# Patient Record
Sex: MALE | Race: WHITE | URBAN - METROPOLITAN AREA
[De-identification: names, ages, dates, MRNs, and addresses within clinical notes are randomized per-mention and may not be internally consistent; named-entity substitution may affect disease eponyms.]

---

## 2017-08-03 ENCOUNTER — APPOINTMENT (RX ONLY)
Dept: URBAN - METROPOLITAN AREA CLINIC 130 | Facility: CLINIC | Age: 37
Setting detail: DERMATOLOGY
End: 2017-08-03

## 2017-08-03 DIAGNOSIS — L30.1 DYSHIDROSIS [POMPHOLYX]: ICD-10-CM

## 2017-08-03 PROCEDURE — ? TREATMENT REGIMEN

## 2017-08-03 PROCEDURE — ? COUNSELING

## 2017-08-03 PROCEDURE — 99202 OFFICE O/P NEW SF 15 MIN: CPT

## 2017-08-03 PROCEDURE — ? PRESCRIPTION

## 2017-08-03 RX ORDER — HALOBETASOL PROPIONATE 0.5 MG/G
CREAM TOPICAL
Qty: 2 | Refills: 1 | Status: ERX | COMMUNITY
Start: 2017-08-03

## 2017-08-03 RX ORDER — HYDROXYZINE HYDROCHLORIDE 10 MG/1
TABLET, FILM COATED ORAL
Qty: 100 | Refills: 1 | Status: ERX | COMMUNITY
Start: 2017-08-03

## 2017-08-03 RX ADMIN — HYDROXYZINE HYDROCHLORIDE: 10 TABLET, FILM COATED ORAL at 20:57

## 2017-08-03 RX ADMIN — HALOBETASOL PROPIONATE: 0.5 CREAM TOPICAL at 20:56

## 2017-08-03 NOTE — PROCEDURE: TREATMENT REGIMEN
Detail Level: Zone
Initiate Treatment: halobetasol cream bid, use 6-8+ times a day at first to get under control\\nHDZ 10 mg 1-2 q 4-6 hours prn itching

## 2017-08-03 NOTE — HPI: RASH
How Severe Is Your Rash?: mild
Is This A New Presentation, Or A Follow-Up?: Rash
How Severe Is Your Rash?: moderate
Additional History: went to urgent care 4 weeks ago when it first started, was prescribed Silvadene cream, TMC cream .05% , Prednisone 20mg bid x 5 days, ketoconazole cream 2%, Clindamycin 300mg tid x 10 days. Things improved for a few days but flared again, today is a good day for the hands.\\nNothing new or different he can think of

## 2017-09-08 ENCOUNTER — APPOINTMENT (RX ONLY)
Dept: URBAN - METROPOLITAN AREA CLINIC 130 | Facility: CLINIC | Age: 37
Setting detail: DERMATOLOGY
End: 2017-09-08

## 2017-09-08 DIAGNOSIS — L30.1 DYSHIDROSIS [POMPHOLYX]: ICD-10-CM

## 2017-09-08 PROBLEM — H91.90 UNSPECIFIED HEARING LOSS, UNSPECIFIED EAR: Status: ACTIVE | Noted: 2017-09-08

## 2017-09-08 PROCEDURE — ? TREATMENT REGIMEN

## 2017-09-08 PROCEDURE — ? PRESCRIPTION

## 2017-09-08 PROCEDURE — 99213 OFFICE O/P EST LOW 20 MIN: CPT

## 2017-09-08 PROCEDURE — ? COUNSELING

## 2017-09-08 RX ORDER — PREDNISONE 5 MG/1
TABLET ORAL
Qty: 150 | Refills: 0 | Status: ERX | COMMUNITY
Start: 2017-09-08

## 2017-09-08 RX ORDER — HALOBETASOL PROPIONATE 0.5 MG/G
CREAM TOPICAL
Qty: 2 | Refills: 2 | Status: ERX

## 2017-09-08 RX ADMIN — PREDNISONE: 5 TABLET ORAL at 22:05

## 2017-09-08 ASSESSMENT — LOCATION SIMPLE DESCRIPTION DERM
LOCATION SIMPLE: LEFT HAND
LOCATION SIMPLE: LEFT FOOT
LOCATION SIMPLE: RIGHT FOOT
LOCATION SIMPLE: RIGHT HAND

## 2017-09-08 ASSESSMENT — LOCATION DETAILED DESCRIPTION DERM
LOCATION DETAILED: LEFT DORSAL FOOT
LOCATION DETAILED: RIGHT DORSAL FOOT
LOCATION DETAILED: LEFT RADIAL PALM
LOCATION DETAILED: RIGHT ULNAR PALM

## 2017-09-08 ASSESSMENT — LOCATION ZONE DERM
LOCATION ZONE: HAND
LOCATION ZONE: FEET

## 2017-09-08 NOTE — PROCEDURE: TREATMENT REGIMEN
Detail Level: Zone
Initiate Treatment: Prednisone 5 mg (7 tabs qd x 2 days, 6 tabs qd x 5 days, 5 tabs qd x 5 days, 4 tabs qd x 7 days, 3 tabs qd x 7 days, 2 tabs qd x 7 days, 1 tab qd x 7 days) #150
Continue Regimen: Halobetasol propionate 0.05 % topical cream (apply bid to affected area) and Hydroxyzine HCl 10 mg tablet (1-2 po q 4-6 hours prn itching)
Plan: discussed patch testing